# Patient Record
Sex: MALE | Race: BLACK OR AFRICAN AMERICAN
[De-identification: names, ages, dates, MRNs, and addresses within clinical notes are randomized per-mention and may not be internally consistent; named-entity substitution may affect disease eponyms.]

---

## 2018-01-17 ENCOUNTER — HOSPITAL ENCOUNTER (OUTPATIENT)
Dept: HOSPITAL 92 - SLEEPLAB | Age: 68
Discharge: HOME | End: 2018-01-17
Attending: FAMILY MEDICINE
Payer: MEDICARE

## 2018-01-17 DIAGNOSIS — R06.83: ICD-10-CM

## 2018-01-17 DIAGNOSIS — R53.83: ICD-10-CM

## 2018-01-17 DIAGNOSIS — G47.33: Primary | ICD-10-CM

## 2018-01-17 DIAGNOSIS — I63.9: ICD-10-CM

## 2018-01-17 DIAGNOSIS — I10: ICD-10-CM

## 2018-01-17 PROCEDURE — 95811 POLYSOM 6/>YRS CPAP 4/> PARM: CPT

## 2020-12-09 ENCOUNTER — HOSPITAL ENCOUNTER (OUTPATIENT)
Dept: HOSPITAL 92 - BICULT | Age: 70
Discharge: HOME | End: 2020-12-09
Attending: INTERNAL MEDICINE
Payer: MEDICARE

## 2020-12-09 DIAGNOSIS — N40.0: ICD-10-CM

## 2020-12-09 DIAGNOSIS — N18.30: Primary | ICD-10-CM

## 2020-12-09 PROCEDURE — 76770 US EXAM ABDO BACK WALL COMP: CPT

## 2020-12-09 NOTE — ULT
BILATERAL RENAL ULTRASOUND:

 

Date:  12/09/2020

 

HISTORY:  

Chronic renal disease. 

 

FINDINGS:

The right kidney measures 10.4 cm in length and the left kidney measures 10.8 cm in length. No focal 
mass or hydronephrosis seen on either side. Cortical echogenicity and thickness is normal. No shadowi
ng calculi are seen. Urinary bladder is grossly unremarkable. There is an enlarged prostate measuring
 6.4 x 4.3 x 4.5 cm. 

 

IMPRESSION: 

Prostatic enlargement, otherwise unremarkable exam. 

 

 

POS: OFF